# Patient Record
Sex: FEMALE | Race: BLACK OR AFRICAN AMERICAN | Employment: FULL TIME | URBAN - METROPOLITAN AREA
[De-identification: names, ages, dates, MRNs, and addresses within clinical notes are randomized per-mention and may not be internally consistent; named-entity substitution may affect disease eponyms.]

---

## 2017-03-14 ENCOUNTER — HOSPITAL ENCOUNTER (EMERGENCY)
Age: 27
Discharge: HOME | End: 2017-03-14
Attending: EMERGENCY MEDICINE
Payer: SELF-PAY

## 2017-03-14 VITALS
BODY MASS INDEX: 40.18 KG/M2 | TEMPERATURE: 98.2 F | HEIGHT: 66 IN | HEART RATE: 57 BPM | RESPIRATION RATE: 16 BRPM | DIASTOLIC BLOOD PRESSURE: 62 MMHG | SYSTOLIC BLOOD PRESSURE: 137 MMHG | WEIGHT: 250 LBS

## 2017-03-14 DIAGNOSIS — N39.0 URINARY TRACT INFECTION WITHOUT HEMATURIA, SITE UNSPECIFIED: Primary | ICD-10-CM

## 2017-03-14 LAB
BACTERIA URNS QL MICRO: 0 /HPF
CASTS URNS QL MICRO: ABNORMAL /LPF
EPI CELLS #/AREA URNS HPF: ABNORMAL /HPF
HCG UR QL: NEGATIVE
RBC #/AREA URNS HPF: ABNORMAL /HPF
WBC URNS QL MICRO: >100 /HPF

## 2017-03-14 PROCEDURE — 81003 URINALYSIS AUTO W/O SCOPE: CPT | Performed by: PHYSICIAN ASSISTANT

## 2017-03-14 PROCEDURE — 81015 MICROSCOPIC EXAM OF URINE: CPT | Performed by: EMERGENCY MEDICINE

## 2017-03-14 PROCEDURE — 81025 URINE PREGNANCY TEST: CPT

## 2017-03-14 PROCEDURE — 99284 EMERGENCY DEPT VISIT MOD MDM: CPT | Performed by: PHYSICIAN ASSISTANT

## 2017-03-14 RX ORDER — CEPHALEXIN 500 MG/1
500 CAPSULE ORAL 3 TIMES DAILY
Qty: 21 CAP | Refills: 0 | Status: SHIPPED | OUTPATIENT
Start: 2017-03-14 | End: 2017-03-21

## 2017-03-14 NOTE — ED PROVIDER NOTES
Patient is a 32 y.o. female presenting with urinary tract infection. The history is provided by the patient. Bladder Infection    This is a new problem. The current episode started yesterday. The problem occurs every urination. The problem has been gradually worsening. The quality of the pain is described as burning. The pain is at a severity of 10/10. The pain is moderate. There has been no fever. She is sexually active. There is no history of pyelonephritis. Associated symptoms include frequency and urgency. Pertinent negatives include no chills, no sweats, no vomiting and no discharge. She has tried nothing for the symptoms. Her past medical history does not include recurrent UTIs. 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 Social History Main Topics    Smoking status: Never Smoker    Smokeless tobacco: Never Used    Alcohol use No    Drug use: No    Sexual activity: Yes     Partners: Male     Birth control/ protection: Condom     Other Topics Concern    Not on file     Social History Narrative         ALLERGIES: Review of patient's allergies indicates no known allergies. Review of Systems   Constitutional: Negative for chills. Gastrointestinal: Negative for vomiting. Genitourinary: Positive for frequency and urgency. All other systems reviewed and are negative.       Vitals:    03/14/17 1630   BP: 137/62   Pulse: (!) 57   Resp: 16   Temp: 98.2 °F (36.8 °C)   Weight: 113.4 kg (250 lb)   Height: 5' 6\" (1.676 m)            Physical Exam   Constitutional: She is oriented to person, place, and time. She appears well-developed and well-nourished. No distress. HENT:   Head: Normocephalic and atraumatic. Right Ear: External ear normal.   Left Ear: External ear normal.   Eyes: Conjunctivae and EOM are normal. Pupils are equal, round, and reactive to light. Neck: Normal range of motion. Neck supple. Cardiovascular: Normal rate and regular rhythm. Pulmonary/Chest: Effort normal and breath sounds normal. No respiratory distress. She has no wheezes. Abdominal: Soft. Bowel sounds are normal. There is no tenderness. There is no rebound and no guarding. Musculoskeletal: She exhibits no edema. Neurological: She is alert and oriented to person, place, and time. Skin: Skin is warm. Nursing note and vitals reviewed.        MDM  Number of Diagnoses or Management Options  Diagnosis management comments: hcg -, urine with bacteria will treat        Amount and/or Complexity of Data Reviewed  Clinical lab tests: ordered and reviewed  Review and summarize past medical records: yes    Risk of Complications, Morbidity, and/or Mortality  Presenting problems: low  Diagnostic procedures: low  Management options: low    Patient Progress  Patient progress: improved    ED Course       Procedures

## 2017-03-14 NOTE — DISCHARGE INSTRUCTIONS
Urinary Tract Infection in Women: Care Instructions  Your Care Instructions    A urinary tract infection, or UTI, is a general term for an infection anywhere between the kidneys and the urethra (where urine comes out). Most UTIs are bladder infections. They often cause pain or burning when you urinate. UTIs are caused by bacteria and can be cured with antibiotics. Be sure to complete your treatment so that the infection goes away. Follow-up care is a key part of your treatment and safety. Be sure to make and go to all appointments, and call your doctor if you are having problems. It's also a good idea to know your test results and keep a list of the medicines you take. How can you care for yourself at home? · Take your antibiotics as directed. Do not stop taking them just because you feel better. You need to take the full course of antibiotics. · Drink extra water and other fluids for the next day or two. This may help wash out the bacteria that are causing the infection. (If you have kidney, heart, or liver disease and have to limit fluids, talk with your doctor before you increase your fluid intake.)  · Avoid drinks that are carbonated or have caffeine. They can irritate the bladder. · Urinate often. Try to empty your bladder each time. · To relieve pain, take a hot bath or lay a heating pad set on low over your lower belly or genital area. Never go to sleep with a heating pad in place. To prevent UTIs  · Drink plenty of water each day. This helps you urinate often, which clears bacteria from your system. (If you have kidney, heart, or liver disease and have to limit fluids, talk with your doctor before you increase your fluid intake.)  · Consider adding cranberry juice to your diet. · Urinate when you need to. · Urinate right after you have sex. · Change sanitary pads often. · Avoid douches, bubble baths, feminine hygiene sprays, and other feminine hygiene products that have deodorants.   · After going to the bathroom, wipe from front to back. When should you call for help? Call your doctor now or seek immediate medical care if:  · Symptoms such as fever, chills, nausea, or vomiting get worse or appear for the first time. · You have new pain in your back just below your rib cage. This is called flank pain. · There is new blood or pus in your urine. · You have any problems with your antibiotic medicine. Watch closely for changes in your health, and be sure to contact your doctor if:  · You are not getting better after taking an antibiotic for 2 days. · Your symptoms go away but then come back. Where can you learn more? Go to http://reese-kristine.info/. Enter W671 in the search box to learn more about \"Urinary Tract Infection in Women: Care Instructions. \"  Current as of: August 12, 2016  Content Version: 11.1  © 3516-3832 [employer]. Care instructions adapted under license by [employer] (which disclaims liability or warranty for this information). If you have questions about a medical condition or this instruction, always ask your healthcare professional. [address] any warranty or liability for your use of this information.

## 2018-06-01 ENCOUNTER — APPOINTMENT (OUTPATIENT)
Dept: GENERAL RADIOLOGY | Age: 28
End: 2018-06-01
Attending: EMERGENCY MEDICINE
Payer: SELF-PAY

## 2018-06-01 ENCOUNTER — HOSPITAL ENCOUNTER (EMERGENCY)
Age: 28
Discharge: HOME | End: 2018-06-01
Attending: EMERGENCY MEDICINE
Payer: SELF-PAY

## 2018-06-01 VITALS
BODY MASS INDEX: 39.24 KG/M2 | DIASTOLIC BLOOD PRESSURE: 52 MMHG | WEIGHT: 250 LBS | TEMPERATURE: 98 F | HEIGHT: 67 IN | SYSTOLIC BLOOD PRESSURE: 107 MMHG | OXYGEN SATURATION: 99 % | RESPIRATION RATE: 16 BRPM | HEART RATE: 55 BPM

## 2018-06-01 DIAGNOSIS — M25.512 ACUTE PAIN OF LEFT SHOULDER: Primary | ICD-10-CM

## 2018-06-01 LAB — HCG UR QL: NEGATIVE

## 2018-06-01 PROCEDURE — 99284 EMERGENCY DEPT VISIT MOD MDM: CPT | Performed by: EMERGENCY MEDICINE

## 2018-06-01 PROCEDURE — 73030 X-RAY EXAM OF SHOULDER: CPT

## 2018-06-01 PROCEDURE — 81025 URINE PREGNANCY TEST: CPT

## 2018-06-01 RX ORDER — DICLOFENAC SODIUM 50 MG/1
50 TABLET, DELAYED RELEASE ORAL 2 TIMES DAILY
Qty: 14 TAB | Refills: 0 | Status: SHIPPED | OUTPATIENT
Start: 2018-06-01 | End: 2018-08-21 | Stop reason: ALTCHOICE

## 2018-06-01 RX ORDER — CYCLOBENZAPRINE HCL 10 MG
10 TABLET ORAL
Qty: 15 TAB | Refills: 0 | Status: SHIPPED | OUTPATIENT
Start: 2018-06-01 | End: 2019-10-03
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
## 2018-06-02 NOTE — ED NOTES
I have reviewed discharge instructions with the patient. The patient verbalized understanding. Patient left ED via Discharge Method: ambulatory to Home with self. Opportunity for questions and clarification provided. Patient given 2 scripts. To continue your aftercare when you leave the hospital, you may receive an automated call from our care team to check in on how you are doing. This is a free service and part of our promise to provide the best care and service to meet your aftercare needs.  If you have questions, or wish to unsubscribe from this service please call [phone]. Thank you for Choosing our [facility] Emergency Department.

## 2018-08-20 ENCOUNTER — HOSPITAL ENCOUNTER (EMERGENCY)
Age: 28
Discharge: LEFT BEFORE DISCHARGE | End: 2018-08-20
Attending: EMERGENCY MEDICINE
Payer: SELF-PAY

## 2018-08-20 VITALS
SYSTOLIC BLOOD PRESSURE: 133 MMHG | DIASTOLIC BLOOD PRESSURE: 89 MMHG | HEIGHT: 67 IN | RESPIRATION RATE: 16 BRPM | WEIGHT: 220 LBS | HEART RATE: 71 BPM | OXYGEN SATURATION: 99 % | BODY MASS INDEX: 34.53 KG/M2 | TEMPERATURE: 98 F

## 2018-08-20 PROCEDURE — 75810000275 HC EMERGENCY DEPT VISIT NO LEVEL OF CARE: Performed by: EMERGENCY MEDICINE

## 2018-08-21 ENCOUNTER — HOSPITAL ENCOUNTER (EMERGENCY)
Age: 28
Discharge: HOME | End: 2018-08-21
Attending: EMERGENCY MEDICINE
Payer: MEDICAID

## 2018-08-21 VITALS
WEIGHT: 220 LBS | DIASTOLIC BLOOD PRESSURE: 55 MMHG | TEMPERATURE: 98.8 F | HEART RATE: 84 BPM | RESPIRATION RATE: 16 BRPM | SYSTOLIC BLOOD PRESSURE: 116 MMHG | BODY MASS INDEX: 33.34 KG/M2 | HEIGHT: 68 IN | OXYGEN SATURATION: 100 %

## 2018-08-21 DIAGNOSIS — L03.011 PARONYCHIA OF FINGER OF RIGHT HAND: Primary | ICD-10-CM

## 2018-08-21 PROCEDURE — 99282 EMERGENCY DEPT VISIT SF MDM: CPT | Performed by: PHYSICIAN ASSISTANT

## 2018-08-21 PROCEDURE — 75810000451 HC DRAIN ABSC FINGER SIMPLE: Performed by: PHYSICIAN ASSISTANT

## 2018-08-21 RX ORDER — SULFAMETHOXAZOLE AND TRIMETHOPRIM 800; 160 MG/1; MG/1
1 TABLET ORAL
Status: DISCONTINUED | OUTPATIENT
Start: 2018-08-21 | End: 2018-08-21

## 2018-08-21 RX ORDER — KETOROLAC TROMETHAMINE 10 MG/1
10 TABLET, FILM COATED ORAL
Status: DISCONTINUED | OUTPATIENT
Start: 2018-08-21 | End: 2018-08-21

## 2018-08-21 RX ORDER — SULFAMETHOXAZOLE AND TRIMETHOPRIM 800; 160 MG/1; MG/1
1 TABLET ORAL 2 TIMES DAILY
Qty: 20 TAB | Refills: 0 | Status: SHIPPED | OUTPATIENT
Start: 2018-08-21 | End: 2018-08-31

## 2018-08-21 RX ORDER — KETOROLAC TROMETHAMINE 10 MG/1
10 TABLET, FILM COATED ORAL
Qty: 15 TAB | Refills: 0 | OUTPATIENT
Start: 2018-08-21 | End: 2019-10-03
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
## 2018-08-21 NOTE — ED TRIAGE NOTES
Pt states having an infection in the ring finger on the right hand. Denies any injury. Pt states it is not draining. Swelling to the tip of the finger.

## 2018-08-21 NOTE — ED NOTES
I have reviewed discharge instructions with the patient. The patient verbalized understanding. Patient left ED via Discharge Method: ambulatory to Home with (insert name of family/friend, self, transport patient). Opportunity for questions and clarification provided. Patient given 2 scripts. To continue your aftercare when you leave the hospital, you may receive an automated call from our care team to check in on how you are doing. This is a free service and part of our promise to provide the best care and service to meet your aftercare needs.  If you have questions, or wish to unsubscribe from this service please call [phone]. Thank you for Choosing our [facility] Emergency Department.

## 2018-08-21 NOTE — DISCHARGE INSTRUCTIONS
Paronychia: Care Instructions  Your Care Instructions  Paronychia (say \"rdka-qp-EN-olga-uh\") is an infection of the skin around a fingernail or toenail. It happens when germs enter through a break in the skin. The doctor may have made a small cut in the infected area to drain the pus. Most cases of paronychia improve in a few days. But watch your symptoms and follow your doctor's advice. Though rare, a mild case can turn into something more serious and infect your entire finger or toe. Also, it is possible for an infection to return. Follow-up care is a key part of your treatment and safety. Be sure to make and go to all appointments, and call your doctor if you are having problems. It's also a good idea to know your test results and keep a list of the medicines you take. How can you care for yourself at home? · If your doctor told you how to care for your infected nail, follow the doctor's instructions. If you did not get instructions, follow this general advice:  ¨ Wash the area with clean water 2 times a day. Don't use hydrogen peroxide or alcohol, which can slow healing. ¨ You may cover the area with a thin layer of petroleum jelly, such as Vaseline, and a nonstick bandage. ¨ Apply more petroleum jelly and replace the bandage as needed. · If your doctor prescribed antibiotics, take them as directed. Do not stop taking them just because you feel better. You need to take the full course of antibiotics. · Take an over-the-counter pain medicine, such as acetaminophen (Tylenol), ibuprofen (Advil, Motrin), or naproxen (Aleve). Read and follow all instructions on the label. · Do not take two or more pain medicines at the same time unless the doctor told you to. Many pain medicines have acetaminophen, which is Tylenol. Too much acetaminophen (Tylenol) can be harmful. · Prop up the toe or finger so that it is higher than the level of your heart. This will help with pain and swelling. · Apply heat.  Put a warm water bottle, heating pad set on low, or warm cloth on your finger or toe. Do not go to sleep with a heating pad on your skin. · Soak the area in warm water twice a day for 15 minutes each time. After soaking, dry the area well and apply a thin layer of petroleum jelly, such as Vaseline. Put on a new bandage. When should you call for help? Call your doctor now or seek immediate medical care if:    · You have signs of new or worsening infection, such as:  ¨ Increased pain, swelling, warmth, or redness. ¨ Red streaks leading from the infected skin. ¨ Pus draining from the area. ¨ A fever.    Watch closely for changes in your health, and be sure to contact your doctor if:    · You do not get better as expected. Where can you learn more? Go to http://reese-rkistine.info/. Enter C435 in the search box to learn more about \"Paronychia: Care Instructions. \"  Current as of: October 5, 2017  Content Version: 11.7  © 0954-2154 [employer]. Care instructions adapted under license by [employer] (which disclaims liability or warranty for this information). If you have questions about a medical condition or this instruction, always ask your healthcare professional. [address] any warranty or liability for your use of this information.

## 2019-02-17 ENCOUNTER — HOSPITAL ENCOUNTER (EMERGENCY)
Age: 29
Discharge: HOME | End: 2019-02-17
Attending: EMERGENCY MEDICINE
Payer: COMMERCIAL

## 2019-02-17 VITALS
OXYGEN SATURATION: 100 % | RESPIRATION RATE: 18 BRPM | TEMPERATURE: 98.1 F | DIASTOLIC BLOOD PRESSURE: 80 MMHG | SYSTOLIC BLOOD PRESSURE: 166 MMHG | HEART RATE: 79 BPM

## 2019-02-17 DIAGNOSIS — J11.1 FLU: Primary | ICD-10-CM

## 2019-02-17 PROCEDURE — 74011250637 HC RX REV CODE- 250/637: Performed by: EMERGENCY MEDICINE

## 2019-02-17 PROCEDURE — 99283 EMERGENCY DEPT VISIT LOW MDM: CPT | Performed by: EMERGENCY MEDICINE

## 2019-02-17 RX ORDER — OSELTAMIVIR PHOSPHATE 75 MG/1
75 CAPSULE ORAL 2 TIMES DAILY
Qty: 10 CAP | Refills: 0 | Status: SHIPPED | OUTPATIENT
Start: 2019-02-17 | End: 2019-02-22

## 2019-02-17 RX ORDER — OSELTAMIVIR PHOSPHATE 75 MG/1
75 CAPSULE ORAL ONCE
Status: COMPLETED | OUTPATIENT
Start: 2019-02-17 | End: 2019-02-17

## 2019-02-17 RX ADMIN — OSELTAMIVIR PHOSPHATE 75 MG: 75 CAPSULE ORAL at 20:51

## 2019-02-17 NOTE — LETTER
[address] [facility] EMERGENCY DEPT 
[address] [place] [id number] 
[phone] Work/School Note Date: 2/17/2019 To Whom It May concern: 
 
Neelam Faith was seen and treated today in the emergency room by the following provider(s): 
Attending Provider: Paco Blanton MD.   
 
Neelam Faith {Return to school/sport/work2/25/2019 Sincerely, Debo Balderas RN

## 2019-02-18 NOTE — ED PROVIDER NOTES
66-year-old lady presents with concerns about the flu. Patient says her daughter was diagnosed with the flu a couple of days ago with a positive flu test.  Patient says that today she started having the body aches and chills although she started with a cough yesterday. She denies any nausea, vomiting, or diarrhea. She said she had a subjective fever earlier today. She denies any other health problem is. Elements of this note were created using speech recognition software. As such, errors of speech recognition may be present. Past Medical History:  
Diagnosis Date  ASCUS with positive high risk HPV cervical   
 Body odor  Eczema  Exposure to STD  History of varicella as a child  Morbid obesity with BMI of 45.0-49.9, adult ([facility] [address]  Obesity in pregnancy  Positive Chlamyida test 05/24/2017  Prediabetes  Strawberry hemangioma Past Surgical History:  
Procedure Laterality Date  HX OTHER SURGICAL Hemangioma to forehead at age 7 months Family History:  
Problem Relation Age of Onset  Hypertension Mother  Diabetes Mother  No Known Problems Father  Downs Syndrome Brother  Cancer Brother   
     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 Tobacco Use  Smoking status: Never Smoker  Smokeless tobacco: Never Used Substance and Sexual Activity  Alcohol use: No  
 Drug use:  No  
  Sexual activity: Yes  
  Partners: Male Birth control/protection: Condom Other Topics Concern  Not on file Social History Narrative  Not on file ALLERGIES: Patient has no known allergies. Review of Systems Constitutional: Positive for chills and fever. Negative for diaphoresis. HENT: Negative for congestion, rhinorrhea and sore throat. Eyes: Negative for redness and visual disturbance. Respiratory: Positive for cough. Negative for chest tightness, shortness of breath and wheezing. Cardiovascular: Negative for chest pain and palpitations. Gastrointestinal: Negative for abdominal pain, blood in stool, diarrhea, nausea and vomiting. Endocrine: Negative for polydipsia and polyuria. Genitourinary: Negative for dysuria and hematuria. Musculoskeletal: Positive for myalgias. Negative for arthralgias and neck stiffness. Skin: Negative for rash. Allergic/Immunologic: Negative for environmental allergies and food allergies. Neurological: Negative for dizziness, weakness and headaches. Hematological: Negative for adenopathy. Does not bruise/bleed easily. Psychiatric/Behavioral: Negative for confusion and sleep disturbance. The patient is not nervous/anxious. Vitals:  
 02/17/19 2026 BP: 166/80 Pulse: 79 Resp: 18 Temp: 98.1 °F (36.7 °C) SpO2: 100% Physical Exam  
Constitutional: She is oriented to person, place, and time. She appears well-developed and well-nourished. HENT:  
Head: Normocephalic and atraumatic. Mouth/Throat: Oropharynx is clear and moist.  
Eyes: Conjunctivae are normal. Pupils are equal, round, and reactive to light. Right eye exhibits no discharge. Left eye exhibits no discharge. Neck: No thyromegaly present. Cardiovascular: Normal rate, regular rhythm and normal heart sounds. No murmur heard.  
Pulmonary/Chest: Effort normal and breath sounds normal.  
 Abdominal: Soft. Bowel sounds are normal. There is no tenderness. There is no rebound and no guarding. Musculoskeletal: Normal range of motion. She exhibits no edema. Neurological: She is alert and oriented to person, place, and time. She exhibits normal muscle tone. Coordination normal.  
Skin: Skin is warm and dry. Psychiatric: She has a normal mood and affect. Her behavior is normal.  
  
 
MDM Number of Diagnoses or Management Options Flu:  
Diagnosis management comments: Patient's symptoms are consistent with the flu. She has a close contact who has had the flu. I discussed the risks, benefits, costs, and alternatives to Tamiflu. I will give her a dose tonight in the emergency department so that she can try to get it filled at a less expensive pharmacy tomorrow. Procedures

## 2019-02-18 NOTE — ED NOTES
I have reviewed discharge instructions with the patient. The patient verbalized understanding. Patient left ED via Discharge Method: ambulatory to Home with ( self). Opportunity for questions and clarification provided. Patient given 1 scripts. To continue your aftercare when you leave the hospital, you may receive an automated call from our care team to check in on how you are doing. This is a free service and part of our promise to provide the best care and service to meet your aftercare needs.  If you have questions, or wish to unsubscribe from this service please call [phone]. Thank you for Choosing our [facility] Emergency Department.

## 2019-10-03 ENCOUNTER — HOSPITAL ENCOUNTER (EMERGENCY)
Age: 29
Discharge: HOME | End: 2019-10-03
Attending: EMERGENCY MEDICINE
Payer: COMMERCIAL

## 2019-10-03 ENCOUNTER — APPOINTMENT (OUTPATIENT)
Dept: GENERAL RADIOLOGY | Age: 29
End: 2019-10-03
Attending: EMERGENCY MEDICINE
Payer: COMMERCIAL

## 2019-10-03 VITALS
TEMPERATURE: 98 F | WEIGHT: 232 LBS | HEART RATE: 78 BPM | DIASTOLIC BLOOD PRESSURE: 81 MMHG | SYSTOLIC BLOOD PRESSURE: 124 MMHG | HEIGHT: 66 IN | OXYGEN SATURATION: 98 % | BODY MASS INDEX: 37.28 KG/M2 | RESPIRATION RATE: 16 BRPM
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
## 2019-10-03 DIAGNOSIS — S40.012A CONTUSION OF LEFT SHOULDER, INITIAL ENCOUNTER: ICD-10-CM

## 2019-10-03 PROCEDURE — 73030 X-RAY EXAM OF SHOULDER: CPT

## 2019-10-03 PROCEDURE — 99283 EMERGENCY DEPT VISIT LOW MDM: CPT | Performed by: PHYSICIAN ASSISTANT

## 2019-10-03 RX ORDER — CYCLOBENZAPRINE HCL 10 MG
5 TABLET ORAL
Qty: 15 TAB | Refills: 0 | Status: SHIPPED | OUTPATIENT
Start: 2019-10-03 | End: 2021-07-28 | Stop reason: ALTCHOICE

## 2019-10-03 RX ORDER — IBUPROFEN 600 MG/1
600 TABLET ORAL
Qty: 20 TAB | Refills: 0 | Status: SHIPPED | OUTPATIENT
Start: 2019-10-03 | End: 2021-07-28 | Stop reason: ALTCHOICE
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
## 2019-10-03 NOTE — ED NOTES
I have reviewed discharge instructions with the patient. The patient verbalized understanding. Patient left ED via Discharge Method: ambulatory to Home with (friend). Opportunity for questions and clarification provided. Patient given 2 scripts. To continue your aftercare when you leave the hospital, you may receive an automated call from our care team to check in on how you are doing. This is a free service and part of our promise to provide the best care and service to meet your aftercare needs.  If you have questions, or wish to unsubscribe from this service please call [phone]. Thank you for Choosing our [facility] Emergency Department.
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
## 2019-10-03 NOTE — LETTER
[address] [address] EMERGENCY DEPT 
[address] [facility] [id number] 
[phone] Work/School Note Date: 10/3/2019 To Whom It May concern: 
 
Loni Dejesus was seen and treated today in the emergency room by the following provider(s): 
Attending Provider: Yolanda Jeffries MD 
Physician Assistant: PEARL Hi. Loni Dejesus may return to work on 10/07/19. Sincerely, PEARL Mullins
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
## 2020-12-27 ENCOUNTER — HOSPITAL ENCOUNTER (EMERGENCY)
Age: 30
Discharge: HOME | End: 2020-12-27
Attending: EMERGENCY MEDICINE
Payer: MEDICAID

## 2020-12-27 VITALS
SYSTOLIC BLOOD PRESSURE: 122 MMHG | RESPIRATION RATE: 18 BRPM | HEIGHT: 66 IN | OXYGEN SATURATION: 98 % | BODY MASS INDEX: 34.23 KG/M2 | DIASTOLIC BLOOD PRESSURE: 75 MMHG | WEIGHT: 213 LBS | HEART RATE: 66 BPM | TEMPERATURE: 98.7 F

## 2020-12-27 DIAGNOSIS — R52 GENERALIZED BODY ACHES: Primary | ICD-10-CM

## 2020-12-27 LAB — HCG UR QL: NEGATIVE

## 2020-12-27 PROCEDURE — 81025 URINE PREGNANCY TEST: CPT

## 2020-12-27 PROCEDURE — 87635 SARS-COV-2 COVID-19 AMP PRB: CPT

## 2020-12-27 PROCEDURE — 99283 EMERGENCY DEPT VISIT LOW MDM: CPT

## 2020-12-28 LAB
SARS COV-2, XPGCVT: POSITIVE
SOURCE, COVRS: ABNORMAL

## 2020-12-28 NOTE — ED NOTES
I have reviewed discharge instructions with the patient. The patient verbalized understanding. Patient left ED via Discharge Method: ambulatory to Home with self. Opportunity for questions and clarification provided. Patient given 0 scripts. To continue your aftercare when you leave the hospital, you may receive an automated call from our care team to check in on how you are doing. This is a free service and part of our promise to provide the best care and service to meet your aftercare needs.  If you have questions, or wish to unsubscribe from this service please call [phone]. Thank you for Choosing our [facility] Emergency Department.

## 2020-12-28 NOTE — ED TRIAGE NOTES
Pt presents ambulatory to triage in no apparent distress with mask in place. Pt complains of generalized body aches for the last three days. Pt denies any changes in level of physical activity lately. PT denies fever, chills. PT denies attempting any OTC medications. Pt denies exposure to sick persons. PT denies other sx of viral illness.

## 2020-12-28 NOTE — ED PROVIDER NOTES
Presents with complaint of generalized body aches and occasional cough. She denies any runny nose fever nausea vomiting or diarrhea. She states it started 4 days ago after she went to the mall. She would like a Covid test.  She denies any dysuria or pregnancy. She has not taken any medication for it because she states she does not really like taking medicines. The history is provided by the patient. Generalized Body Aches  This is a new problem. The current episode started more than 2 days ago. The problem occurs constantly. The problem has not changed since onset. Pertinent negatives include no abdominal pain. Past Medical History:   Diagnosis Date    ASCUS with positive high risk HPV cervical     Body odor     Eczema     Exposure to STD     History of varicella as a child     Morbid obesity with BMI of 45.0-49.9, adult ([facility] [address]     Obesity in pregnancy     Positive Chlamyida test 05/24/2017    Prediabetes     Strawberry hemangioma        Past Surgical History:   Procedure Laterality Date    HX OTHER SURGICAL      Hemangioma to forehead at age 7 months         Family History:   Problem Relation Age of Onset    Hypertension Mother     Diabetes Mother     No Known Problems Father    Margert Nyhan Syndrome Brother     Cancer Brother         leukomia    Heart Disease Paternal Grandmother        Social History     Socioeconomic History    Marital status: SINGLE     Spouse name: Not on file    Number of children: Not on file    Years of education: Not on file    Highest education level: Not on file   Occupational History    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     Social Needs    Financial resource strain: Not on file    Food insecurity     Worry: Not on file     Inability: Not on file    Transportation needs     Medical: Not on file     Non-medical: Not on file   Tobacco Use    Smoking status: Never Smoker    Smokeless tobacco: Never Used   Substance and Sexual Activity  Alcohol use: No    Drug use: No    Sexual activity: Yes     Partners: Male     Birth control/protection: Condom   Lifestyle    Physical activity     Days per week: Not on file     Minutes per session: Not on file    Stress: Not on file   Relationships    Social connections     Talks on phone: Not on file     Gets together: Not on file     Attends Confucianist service: Not on file     Active member of club or organization: Not on file     Attends meetings of clubs or organizations: Not on file     Relationship status: Not on file    Intimate partner violence     Fear of current or ex partner: Not on file     Emotionally abused: Not on file     Physically abused: Not on file     Forced sexual activity: Not on file   Other Topics Concern    Not on file   Social History Narrative    Not on file         ALLERGIES: Patient has no known allergies. Review of Systems   Constitutional: Negative for chills and fever. Respiratory: Positive for cough. Negative for wheezing. Gastrointestinal: Negative for abdominal pain, diarrhea and nausea. Skin: Negative for rash. All other systems reviewed and are negative. Vitals:    12/27/20 2051 12/27/20 2247   BP: 124/79 122/75   Pulse: 70 66   Resp: 16 18   Temp: 98.6 °F (37 °C) 98.7 °F (37.1 °C)   SpO2: 99% 98%   Weight: 96.6 kg (213 lb)    Height: 5' 6\" (1.676 m)             Physical Exam  Vitals signs and nursing note reviewed. Constitutional:       General: She is not in acute distress. Appearance: Normal appearance. She is well-developed. She is not ill-appearing or diaphoretic. HENT:      Head: Normocephalic and atraumatic. Mouth/Throat:      Mouth: Mucous membranes are dry. Eyes:      General:         Right eye: No discharge. Left eye: No discharge. Conjunctiva/sclera: Conjunctivae normal.   Neck:      Musculoskeletal: Normal range of motion and neck supple. Cardiovascular:      Rate and Rhythm: Normal rate and regular rhythm. Pulmonary:      Effort: Pulmonary effort is normal. No respiratory distress. Breath sounds: Normal breath sounds. No wheezing or rales. Abdominal:      General: There is no distension. Palpations: Abdomen is soft. Tenderness: There is no abdominal tenderness. Musculoskeletal: Normal range of motion. Right lower leg: No edema. Left lower leg: No edema. Skin:     General: Skin is warm and dry. Capillary Refill: Capillary refill takes less than 2 seconds. Neurological:      General: No focal deficit present. Mental Status: She is alert and oriented to person, place, and time. Cranial Nerves: No cranial nerve deficit.    Psychiatric:         Mood and Affect: Mood normal.         Behavior: Behavior normal.          MDM  Number of Diagnoses or Management Options  Generalized body aches  Diagnosis management comments: Urine is negative she is not pregnant we will do Covid test, low index of suspicion       Amount and/or Complexity of Data Reviewed  Clinical lab tests: reviewed and ordered    Risk of Complications, Morbidity, and/or Mortality  Presenting problems: low  Diagnostic procedures: minimal  Management options: low    Patient Progress  Patient progress: stable         Procedures

## 2020-12-29 NOTE — PROGRESS NOTES
12/29/20 Patient notified of positive Covid 19 result; home care instructions discussed; questions answered

## 2024-07-29 ENCOUNTER — HOSPITAL ENCOUNTER (EMERGENCY)
Age: 34
Discharge: HOME | End: 2024-07-30
Payer: COMMERCIAL

## 2024-07-29 DIAGNOSIS — N30.00 ACUTE CYSTITIS WITHOUT HEMATURIA: ICD-10-CM

## 2024-07-29 DIAGNOSIS — J02.9 ACUTE PHARYNGITIS, UNSPECIFIED ETIOLOGY: Primary | ICD-10-CM

## 2024-07-29 LAB
ALBUMIN SERPL-MCNC: 4.1 G/DL (ref 3.5–5)
ALBUMIN/GLOB SERPL: 1.3 (ref 1–1.9)
ALP SERPL-CCNC: 43 U/L (ref 35–104)
ALT SERPL-CCNC: 19 U/L (ref 12–65)
ANION GAP SERPL CALC-SCNC: 11 MMOL/L (ref 9–18)
AST SERPL-CCNC: 27 U/L (ref 15–37)
BACTERIA URNS QL MICRO: ABNORMAL /HPF
BASOPHILS NFR BLD: 0 % (ref 0–2)
BILIRUB SERPL-MCNC: 0.3 MG/DL (ref 0–1.2)
BUN SERPL-MCNC: 11 MG/DL (ref 6–23)
CALCIUM SERPL-MCNC: 9.5 MG/DL (ref 8.8–10.2)
CHLORIDE SERPL-SCNC: 99 MMOL/L (ref 98–107)
CO2 SERPL-SCNC: 23 MMOL/L (ref 20–28)
CREAT SERPL-MCNC: 0.78 MG/DL (ref 0.6–1.1)
DIFFERENTIAL METHOD BLD: ABNORMAL
EOSINOPHIL # BLD: 0.1 K/UL (ref 0–0.8)
EPI CELLS #/AREA URNS HPF: ABNORMAL /HPF
ERYTHROCYTE [DISTWIDTH] IN BLOOD BY AUTOMATED COUNT: 12.8 % (ref 11.9–14.6)
FLUAV RNA SPEC QL NAA+PROBE: NOT DETECTED
FLUBV RNA SPEC QL NAA+PROBE: NOT DETECTED
GLOBULIN SER CALC-MCNC: 3.3 G/DL (ref 2.3–3.5)
GLUCOSE SERPL-MCNC: 82 MG/DL (ref 70–99)
HCT VFR BLD AUTO: 39.5 % (ref 35.8–46.3)
HGB BLD-MCNC: 13.5 G/DL (ref 11.7–15.4)
HYALINE CASTS URNS QL MICRO: ABNORMAL /LPF
IMM GRANULOCYTES NFR BLD AUTO: 1 % (ref 0–5)
KETONES UR-MCNC: NEGATIVE MG/DL
LEUKOCYTE ESTERASE UR QL STRIP: ABNORMAL
LIPASE SERPL-CCNC: 19 U/L (ref 13–60)
LYMPHOCYTES # BLD: 0.5 K/UL (ref 0.5–4.6)
LYMPHOCYTES NFR BLD: 10 % (ref 13–44)
MCH RBC QN AUTO: 30.7 PG (ref 26.1–32.9)
MCHC RBC AUTO-ENTMCNC: 34.2 G/DL (ref 31.4–35)
MCV RBC AUTO: 89.8 FL (ref 82–102)
MONOCYTES # BLD: 0.7 K/UL (ref 0.1–1.3)
MONOCYTES NFR BLD: 13 % (ref 4–12)
NEUTS SEG # BLD: 4 K/UL (ref 1.7–8.2)
NEUTS SEG NFR BLD: 74 % (ref 43–78)
NITRITE UR QL: NEGATIVE
NRBC # BLD: 0 K/UL (ref 0–0.2)
PH UR: 5.5 (ref 5–9)
PLATELET # BLD AUTO: 211 K/UL (ref 150–450)
PMV BLD AUTO: 10.6 FL (ref 9.4–12.3)
POTASSIUM SERPL-SCNC: 4.7 MMOL/L (ref 3.5–5.1)
PROT SERPL-MCNC: 7.4 G/DL (ref 6.3–8.2)
PROT UR QL: NEGATIVE MG/DL
RBC # BLD AUTO: 4.4 M/UL (ref 4.05–5.2)
RBC # UR STRIP: ABNORMAL
RBC #/AREA URNS HPF: ABNORMAL /HPF
SARS-COV-2 RDRP RESP QL NAA+PROBE: NOT DETECTED
SERVICE CMNT-IMP: ABNORMAL
SODIUM SERPL-SCNC: 133 MMOL/L (ref 136–145)
SOURCE: NORMAL
SP GR UR: 1.02 (ref 1–1.02)
UROBILINOGEN UR QL: 0.2 EU/DL (ref 0.2–1)
WBC # BLD AUTO: 5.4 K/UL (ref 4.3–11.1)
WBC URNS QL MICRO: ABNORMAL /HPF

## 2024-07-29 PROCEDURE — 87502 INFLUENZA DNA AMP PROBE: CPT

## 2024-07-29 PROCEDURE — 87635 SARS-COV-2 COVID-19 AMP PRB: CPT

## 2024-07-29 PROCEDURE — 80053 COMPREHEN METABOLIC PANEL: CPT

## 2024-07-29 PROCEDURE — 99283 EMERGENCY DEPT VISIT LOW MDM: CPT

## 2024-07-29 PROCEDURE — 81001 URINALYSIS AUTO W/SCOPE: CPT

## 2024-07-29 PROCEDURE — 83690 ASSAY OF LIPASE: CPT

## 2024-07-29 PROCEDURE — 87651 STREP A DNA AMP PROBE: CPT

## 2024-07-29 PROCEDURE — 85025 COMPLETE CBC W/AUTO DIFF WBC: CPT

## 2024-07-29 ASSESSMENT — ENCOUNTER SYMPTOMS
RECTAL PAIN: 0
CONSTIPATION: 0
ABDOMINAL PAIN: 1
CHEST TIGHTNESS: 0
SORE THROAT: 1
SHORTNESS OF BREATH: 0
DIARRHEA: 0

## 2024-07-29 ASSESSMENT — LIFESTYLE VARIABLES
HOW MANY STANDARD DRINKS CONTAINING ALCOHOL DO YOU HAVE ON A TYPICAL DAY: PATIENT DOES NOT DRINK
HOW OFTEN DO YOU HAVE A DRINK CONTAINING ALCOHOL: NEVER

## 2024-07-29 ASSESSMENT — PAIN DESCRIPTION - LOCATION: LOCATION: BACK;ABDOMEN;GENERALIZED

## 2024-07-29 ASSESSMENT — PAIN - FUNCTIONAL ASSESSMENT: PAIN_FUNCTIONAL_ASSESSMENT: 0-10

## 2024-07-29 ASSESSMENT — PAIN SCALES - GENERAL: PAINLEVEL_OUTOF10: 8

## 2024-07-29 ASSESSMENT — PAIN DESCRIPTION - DESCRIPTORS: DESCRIPTORS: ACHING

## 2024-07-30 VITALS
OXYGEN SATURATION: 100 % | HEIGHT: 67 IN | WEIGHT: 264 LBS | HEART RATE: 85 BPM | SYSTOLIC BLOOD PRESSURE: 132 MMHG | RESPIRATION RATE: 17 BRPM | TEMPERATURE: 98.3 F | DIASTOLIC BLOOD PRESSURE: 91 MMHG | BODY MASS INDEX: 41.44 KG/M2

## 2024-07-30 LAB — STREP, MOLECULAR: NOT DETECTED

## 2024-07-30 RX ORDER — CEPHALEXIN 500 MG/1
500 CAPSULE ORAL 2 TIMES DAILY
Qty: 14 CAPSULE | Refills: 0 | Status: SHIPPED | OUTPATIENT
Start: 2024-07-30 | End: 2024-08-06

## 2024-07-30 NOTE — DISCHARGE INSTRUCTIONS
COVID, strep, flu testing was negative.  Your lab work was very reassuring.  Urinalysis appears that he may have a early urinary tract infection.  Please start Keflex twice daily for the next 7 days.  Keep follow-up with your primary care doctor as previously scheduled.

## 2024-07-30 NOTE — ED TRIAGE NOTES
Patient arrives ambulatory to triage. Reports fever, chills, body aches, abdominal pain and sore throat that began today. Reports she is concerned for COVID.

## 2024-07-30 NOTE — ED PROVIDER NOTES
Emergency Department Provider Note       PCP: Marianna Balbuena APRN - NP   Age: 33 y.o.   Sex: female     DISPOSITION Decision To Discharge 07/30/2024 12:53:25 AM       ICD-10-CM    1. Acute pharyngitis, unspecified etiology  J02.9       2. Acute cystitis without hematuria  N30.00           Medical Decision Making     33-year-old female presents with concern for COVID.  Reports pharyngitis.  Viral and strep testing negative.  Labs stable.  Does have mild UTI with dysuria will treat discharged to follow-up with primary care.     1 or more acute illnesses that pose a threat to life or bodily function.   Over the counter drug management performed.  Prescription drug management performed.  Patient was discharged risks and benefits of hospitalization were considered.  Shared medical decision making was utilized in creating the patients health plan today.    I independently ordered and reviewed each unique test.  I reviewed external records: ED visit note from an outside group.  I reviewed external records: provider visit note from PCP.                   History     33 female presents with concern for COVID.  Reports that she had an onset of sore throat today.  This was after visiting a friend in the hospital.  She has had no fevers, chills, nausea, vomiting or diarrhea.  States she has had some chronic abdominal pain which she has made an appointment with a PCP to follow-up on.  This has been ongoing for several weeks.  She has no dysuria or vaginal discharge.    The history is provided by the patient.       ROS     Review of Systems   Constitutional:  Negative for chills, fatigue and fever.   HENT:  Positive for sore throat. Negative for congestion.    Respiratory:  Negative for chest tightness and shortness of breath.    Cardiovascular:  Negative for chest pain.   Gastrointestinal:  Positive for abdominal pain. Negative for constipation, diarrhea and rectal pain.   Genitourinary:  Negative for dysuria, vaginal